# Patient Record
Sex: MALE | Race: BLACK OR AFRICAN AMERICAN | NOT HISPANIC OR LATINO | URBAN - METROPOLITAN AREA
[De-identification: names, ages, dates, MRNs, and addresses within clinical notes are randomized per-mention and may not be internally consistent; named-entity substitution may affect disease eponyms.]

---

## 2017-07-21 ENCOUNTER — ALLSCRIPTS OFFICE VISIT (OUTPATIENT)
Dept: OTHER | Facility: OTHER | Age: 32
End: 2017-07-21

## 2017-10-12 ENCOUNTER — ALLSCRIPTS OFFICE VISIT (OUTPATIENT)
Dept: OTHER | Facility: OTHER | Age: 32
End: 2017-10-12

## 2017-10-13 ENCOUNTER — ALLSCRIPTS OFFICE VISIT (OUTPATIENT)
Dept: OTHER | Facility: OTHER | Age: 32
End: 2017-10-13

## 2018-01-10 NOTE — MISCELLANEOUS
Message  Return to work or school:   Mahesh Johnson is under my professional care  He was seen in my office on 10/13/17  He was seen and examined in our office  He is experiencing symptoms of depression as a result of his chronic left sided gynecomastia, which now necessitates prescription therapy  Shahrzad Vila DO        Signatures   Electronically signed by : Shahrzad Vila DO; Oct 13 2017  9:47PM EST                       (Author)

## 2018-01-13 VITALS
WEIGHT: 185 LBS | BODY MASS INDEX: 28.04 KG/M2 | HEIGHT: 68 IN | TEMPERATURE: 96.9 F | DIASTOLIC BLOOD PRESSURE: 86 MMHG | SYSTOLIC BLOOD PRESSURE: 124 MMHG | HEART RATE: 78 BPM | RESPIRATION RATE: 16 BRPM

## 2018-01-15 NOTE — MISCELLANEOUS
Provider Comments  Provider Comments:   Pt over slept for appt on 10/12/17   rescheduled for 10/13/17      Signatures   Electronically signed by : Sharmin Faye DO; Oct 12 2017 11:34AM EST                       (Author)

## 2018-01-18 NOTE — MISCELLANEOUS
Provider Comments  Provider Comments:   Patient did not show for scheduled appointment today at 8:30am       Signatures   Electronically signed by : BERTHA Ryan ; Sep 15 2017  2:25PM EST

## 2018-11-04 PROBLEM — F41.8 DEPRESSION WITH ANXIETY: Status: ACTIVE | Noted: 2017-10-13

## 2018-11-06 ENCOUNTER — TELEPHONE (OUTPATIENT)
Dept: FAMILY MEDICINE CLINIC | Facility: CLINIC | Age: 33
End: 2018-11-06

## 2019-04-30 ENCOUNTER — OFFICE VISIT (OUTPATIENT)
Dept: FAMILY MEDICINE CLINIC | Facility: CLINIC | Age: 34
End: 2019-04-30
Payer: COMMERCIAL

## 2019-04-30 VITALS
TEMPERATURE: 96.8 F | DIASTOLIC BLOOD PRESSURE: 64 MMHG | WEIGHT: 186 LBS | HEIGHT: 68 IN | HEART RATE: 80 BPM | SYSTOLIC BLOOD PRESSURE: 90 MMHG | BODY MASS INDEX: 28.19 KG/M2 | RESPIRATION RATE: 16 BRPM

## 2019-04-30 DIAGNOSIS — Z13.1 SCREENING FOR DIABETES MELLITUS (DM): ICD-10-CM

## 2019-04-30 DIAGNOSIS — Z13.89 SCREENING FOR CARDIOVASCULAR, RESPIRATORY, AND GENITOURINARY DISEASES: ICD-10-CM

## 2019-04-30 DIAGNOSIS — N62 GYNECOMASTIA: ICD-10-CM

## 2019-04-30 DIAGNOSIS — Z00.00 HEALTHCARE MAINTENANCE: Primary | ICD-10-CM

## 2019-04-30 DIAGNOSIS — Z13.83 SCREENING FOR CARDIOVASCULAR, RESPIRATORY, AND GENITOURINARY DISEASES: ICD-10-CM

## 2019-04-30 DIAGNOSIS — Z13.6 SCREENING FOR CARDIOVASCULAR, RESPIRATORY, AND GENITOURINARY DISEASES: ICD-10-CM

## 2019-04-30 DIAGNOSIS — F41.8 DEPRESSION WITH ANXIETY: ICD-10-CM

## 2019-04-30 PROCEDURE — 99395 PREV VISIT EST AGE 18-39: CPT | Performed by: FAMILY MEDICINE

## 2019-05-17 LAB
ALBUMIN SERPL-MCNC: 4.3 G/DL (ref 3.6–5.1)
ALBUMIN/GLOB SERPL: 1.6 (CALC) (ref 1–2.5)
ALP SERPL-CCNC: 70 U/L (ref 40–115)
ALT SERPL-CCNC: 18 U/L (ref 9–46)
AST SERPL-CCNC: 16 U/L (ref 10–40)
BILIRUB SERPL-MCNC: 0.5 MG/DL (ref 0.2–1.2)
BUN SERPL-MCNC: 9 MG/DL (ref 7–25)
BUN/CREAT SERPL: NORMAL (CALC) (ref 6–22)
CALCIUM SERPL-MCNC: 9.1 MG/DL (ref 8.6–10.3)
CHLORIDE SERPL-SCNC: 104 MMOL/L (ref 98–110)
CHOLEST SERPL-MCNC: 140 MG/DL
CHOLEST/HDLC SERPL: 3 (CALC)
CO2 SERPL-SCNC: 28 MMOL/L (ref 20–32)
CREAT SERPL-MCNC: 1.01 MG/DL (ref 0.6–1.35)
GLOBULIN SER CALC-MCNC: 2.7 G/DL (CALC) (ref 1.9–3.7)
GLUCOSE SERPL-MCNC: 90 MG/DL (ref 65–99)
HDLC SERPL-MCNC: 47 MG/DL
LDLC SERPL CALC-MCNC: 80 MG/DL (CALC)
NONHDLC SERPL-MCNC: 93 MG/DL (CALC)
POTASSIUM SERPL-SCNC: 4.1 MMOL/L (ref 3.5–5.3)
PROT SERPL-MCNC: 7 G/DL (ref 6.1–8.1)
SL AMB EGFR AFRICAN AMERICAN: 114 ML/MIN/1.73M2
SL AMB EGFR NON AFRICAN AMERICAN: 98 ML/MIN/1.73M2
SODIUM SERPL-SCNC: 138 MMOL/L (ref 135–146)
TRIGL SERPL-MCNC: 58 MG/DL

## 2019-09-27 ENCOUNTER — OFFICE VISIT (OUTPATIENT)
Dept: FAMILY MEDICINE CLINIC | Facility: CLINIC | Age: 34
End: 2019-09-27
Payer: COMMERCIAL

## 2019-09-27 VITALS
HEIGHT: 68 IN | BODY MASS INDEX: 29.55 KG/M2 | DIASTOLIC BLOOD PRESSURE: 70 MMHG | OXYGEN SATURATION: 95 % | HEART RATE: 67 BPM | SYSTOLIC BLOOD PRESSURE: 110 MMHG | RESPIRATION RATE: 16 BRPM | TEMPERATURE: 97 F | WEIGHT: 195 LBS

## 2019-09-27 DIAGNOSIS — N61.0 ACUTE MASTITIS OF LEFT BREAST: Primary | ICD-10-CM

## 2019-09-27 DIAGNOSIS — N62 GYNECOMASTIA: ICD-10-CM

## 2019-09-27 DIAGNOSIS — F41.8 DEPRESSION WITH ANXIETY: ICD-10-CM

## 2019-09-27 DIAGNOSIS — Z72.53 HIGH RISK BISEXUAL BEHAVIOR: ICD-10-CM

## 2019-09-27 PROCEDURE — 99214 OFFICE O/P EST MOD 30 MIN: CPT | Performed by: FAMILY MEDICINE

## 2019-09-27 PROCEDURE — 3008F BODY MASS INDEX DOCD: CPT | Performed by: FAMILY MEDICINE

## 2019-09-27 RX ORDER — AMOXICILLIN AND CLAVULANATE POTASSIUM 875; 125 MG/1; MG/1
1 TABLET, FILM COATED ORAL 2 TIMES DAILY
Qty: 20 TABLET | Refills: 0 | Status: SHIPPED | OUTPATIENT
Start: 2019-09-27 | End: 2019-10-07

## 2019-09-27 NOTE — PROGRESS NOTES
Assessment/Plan:    No problem-specific Assessment & Plan notes found for this encounter  Gynecomastia causes him mental anguish, on medication  Warm compresses  US left breast  F/u with surgeon  Has been to endocrinologist in past  Depression stable on meds     Diagnoses and all orders for this visit:    Acute mastitis of left breast  -     amoxicillin-clavulanate (AUGMENTIN) 875-125 mg per tablet; Take 1 tablet by mouth 2 (two) times a day for 10 days  -     US breast left limited (diagnostic); Future    Depression with anxiety  -     sertraline (ZOLOFT) 50 mg tablet; Take 1 tablet (50 mg total) by mouth daily    Gynecomastia    High risk bisexual behavior        Return if symptoms worsen or fail to improve  Subjective:      Patient ID: Jarret Aguilar is a 36 y o  male  Chief Complaint   Patient presents with    Medication Refill     wmcma       HPI  Planning to see plastic surgeon on 10/4/19  Left breast painful/swelling lately  Has happened before  No discharge  No redness or fever  Tender  Has been to endo in past    The following portions of the patient's history were reviewed and updated as appropriate: allergies, current medications, past family history, past medical history, past social history, past surgical history and problem list     Review of Systems   Constitutional: Negative for chills and fever  Current Outpatient Medications   Medication Sig Dispense Refill    Emtricitabine-Tenofovir DF (TRUVADA) 100-150 MG TABS Take 1 tablet by mouth daily      Multiple Vitamin (MULTIVITAMINS PO) Take by mouth      sertraline (ZOLOFT) 50 mg tablet Take 1 tablet (50 mg total) by mouth daily 30 tablet 5    amoxicillin-clavulanate (AUGMENTIN) 875-125 mg per tablet Take 1 tablet by mouth 2 (two) times a day for 10 days 20 tablet 0     No current facility-administered medications for this visit          Objective:    /70   Pulse 67   Temp (!) 97 °F (36 1 °C)   Resp 16   Ht 5' 7 5" (1 715 m)   Wt 88 5 kg (195 lb)   SpO2 95%   BMI 30 09 kg/m²        Physical Exam   Constitutional: No distress  HENT:   Right Ear: External ear normal    Left Ear: External ear normal    Mouth/Throat: No oropharyngeal exudate  Eyes: No scleral icterus  Cardiovascular: Normal rate and regular rhythm  Pulmonary/Chest: Breath sounds normal  No respiratory distress  Abdominal: Soft  He exhibits no distension  Skin: No rash noted  No pallor     Left breast fullness and tenderness, slight warmth, no skin changes, some tender glandular changes retroareolar              Dotty Junes, DO

## 2019-10-03 ENCOUNTER — HOSPITAL ENCOUNTER (OUTPATIENT)
Dept: ULTRASOUND IMAGING | Facility: CLINIC | Age: 34
Discharge: HOME | End: 2019-10-03
Payer: COMMERCIAL

## 2019-10-03 ENCOUNTER — HOSPITAL ENCOUNTER (OUTPATIENT)
Dept: MAMMOGRAPHY | Facility: CLINIC | Age: 34
Discharge: HOME | End: 2019-10-03
Payer: COMMERCIAL

## 2019-10-03 VITALS — BODY MASS INDEX: 29.55 KG/M2 | HEIGHT: 68 IN | WEIGHT: 195 LBS

## 2019-10-03 DIAGNOSIS — N61.0 ACUTE MASTITIS: ICD-10-CM

## 2019-10-03 DIAGNOSIS — N61.0 ACUTE MASTITIS OF LEFT BREAST: ICD-10-CM

## 2019-10-03 PROCEDURE — 76642 ULTRASOUND BREAST LIMITED: CPT

## 2019-10-03 PROCEDURE — 77066 DX MAMMO INCL CAD BI: CPT

## 2019-11-13 ENCOUNTER — SOCIAL WORK (OUTPATIENT)
Dept: BEHAVIORAL/MENTAL HEALTH CLINIC | Facility: CLINIC | Age: 34
End: 2019-11-13
Payer: COMMERCIAL

## 2019-11-13 DIAGNOSIS — F33.1 MAJOR DEPRESSIVE DISORDER, RECURRENT EPISODE, MODERATE (HCC): Primary | ICD-10-CM

## 2019-11-13 DIAGNOSIS — F41.1 GENERALIZED ANXIETY DISORDER: ICD-10-CM

## 2019-11-13 PROCEDURE — 90834 PSYTX W PT 45 MINUTES: CPT | Performed by: SOCIAL WORKER

## 2019-11-13 NOTE — PSYCH
Assessment/Plan:     F33 1  Major depressive disorder, recurrent episode, moderate  F41  Generalized anxiety disorder  Subjective:    Patient ID: Ryanne Mccullough is a 36 y o  male who presented for an initial outpatient individual counseling session following his most recent office visits with his primary care physician on April 30, 2019 and September 27, 2019  At the April 30, 2019 office visit, Corrie Madden disclosed he goes to a STD clinic in [facility] and is prescribed Truvada  He presents with chronic gynecomastia and was recommended to see a plastic surgeon  At that time, Corrie Madden declined a referral to [address] [place] [address] for outpatient counseling services and as a result, was started on Zoloft 50 MG  For his September 27, 2019 office visit, Corrie Madden presented with acute mastitis of left breast   His gynecomastia causes him mental anguish  He has been treated by an endocrinologist in the past   Corrie Madden engages in high risk bisexual behavior  He is depressed and anxious and was referred to [address] [place] [address] for outpatient counseling services  He has been compliant with Zoloft 50 MG  The undersigned therapist provided Corrie Madden with an orientation to [address] [place] [address] services by summarizing the counseling experience, counseling process, and philosophy of treatment  Discussed hospital policies and paid special attention to reviewing the limitations of confidentiality and emergency policies  Began the process of establishing rapport and gaining a thorough understanding of the presenting problem by completing a comprehensive psychosocial assessment  Corrie Madden states he feels "up and down"  When he started Zoloft he initially felt better but now he is unsure whether he is depressed or not  Corrie Madden claims that people keep telling him he's depressed  There is no prior history of participating in outpatient mental health services      He admits to having suicidal ideation last year  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  He dropped out of college  Opal Springer lives at home with his family and does not report any stressors there  The undersigned therapist recommended that Opal Springer participate in outpatient counseling services once per week  Due to the undersigned therapist's booked schedule, he was provided with alternate options for mental health services  HPI    Review of Systems      Objective:  Opal Springer presented for today's session as his stated age  He was oriented x3 and maintained adequate eye contact  Opal Springer was dressed neatly, casually and was well-groomed with adequate hygiene  He demonstrated the ability to maintain adequate levels of focus and concentration  His short- and long-term memory appeared intact  There was no evidence of a thought disorder or psychosis  He denied suicidal ideation, gesture or plan  Opal Springer presented with a cooperative attitude and he was easily engaged in the therapeutic process  He demonstrated difficulty identifying and expressing his feelings in an age-appropriate manner  His mood was depressed and anxious  John's insight and judgement was assessed to be poor       Physical Exam

## 2019-11-20 ENCOUNTER — SOCIAL WORK (OUTPATIENT)
Dept: BEHAVIORAL/MENTAL HEALTH CLINIC | Facility: CLINIC | Age: 34
End: 2019-11-20
Payer: COMMERCIAL

## 2019-11-20 DIAGNOSIS — F33.1 MAJOR DEPRESSIVE DISORDER, RECURRENT EPISODE, MODERATE (HCC): Primary | ICD-10-CM

## 2019-11-20 PROCEDURE — 90834 PSYTX W PT 45 MINUTES: CPT | Performed by: SOCIAL WORKER

## 2019-11-20 NOTE — PSYCH
Assessment/Plan:     F33 1  Major depressive episode, recurrent episode, moderate  Subjective:    Patient ID: Walter Gann is a 36 y o  male who presented for a follow-up outpatient individual counseling session  Ijeoma Newman is continuing to struggle coping with stressors secondary to gynecomastia  He reports he started working out at [employer] but is self-conscious about his appearance  Ijeoma Newman struggles to identify his feelings and emotions in an age-appropriate manner  With prompting, he appeared to deny depression and anxiety  Ijeoma Newman is scheduled to meet with a plastic surgeon on January 4, 2020 for a consultation regarding surgical options for gynecomastia  He is attempting to get insurance approval for the surgery as he was previously denied when he had coverage through [employer]  Since that time, he has been unsuccessful in his attempts to save $5,000 to pay for the surgery  The undersigned therapist provided supportive counseling regarding John's attempts to seek insurance authorization for the surgery  The undersigned therapist will assist his primary care physician with providing the appropriate documentation to support the medical necessity of the surgery if his current insurance company denies coverage for the surgery  HPI    Review of Systems      Objective:  Ijeoma Newman presented for today's session with a cooperative attitude and was easily engaged in the therapeutic process  His mood was depressed and anxious with an affect that was congruent to topic  There was no evidence of a thought disorder or psychosis  He denied suicidal ideation, gesture or plan  Ijeoma Newman appears to be less depressed and anxious since prescribed antidepressant medication       Physical Exam

## 2020-01-03 ENCOUNTER — OFFICE VISIT (OUTPATIENT)
Dept: PLASTIC SURGERY | Facility: CLINIC | Age: 35
End: 2020-01-03
Payer: COMMERCIAL

## 2020-01-03 VITALS — BODY MASS INDEX: 27.28 KG/M2 | WEIGHT: 180 LBS | HEIGHT: 68 IN

## 2020-01-03 DIAGNOSIS — N61.0 ACUTE MASTITIS OF LEFT BREAST: ICD-10-CM

## 2020-01-03 DIAGNOSIS — N62 GYNECOMASTIA: Primary | ICD-10-CM

## 2020-01-03 PROCEDURE — 99204 OFFICE O/P NEW MOD 45 MIN: CPT | Performed by: PLASTIC SURGERY

## 2020-01-03 RX ORDER — EMTRICITABINE AND TENOFOVIR DISOPROXIL FUMARATE 200; 300 MG/1; MG/1
TABLET, FILM COATED ORAL
COMMUNITY
Start: 2019-10-28 | End: 2020-04-22

## 2020-01-03 NOTE — PROGRESS NOTES
Assessment/Plan   Diagnoses and all orders for this visit:    Gynecomastia    Acute mastitis of left breast    Other orders  -     TRUVADA 200-300 MG per tablet    Mr Miriam Dye is a 36 y o  F with benign Grade IIII gynecomastia related primarily to puberty and possible hormonal related, exacerbated by previous medications in the past   We discussed the nature of surgery available for his problem    I believe she would benefit from left nipple sparing mastectomy via an infraareola incision would be the most likely method of reduction  The procedure was discussed at length with the patient during her visit today  The operative details and expected post-operative course were outlined  I also explained that potential complications included, but were not limited to: change or loss in sensation of the nipple and surrounding nipple/areola complex that may be transient or permanent, scars that will be visible on cut surfaces of the breast, wound separation, infection, breast asymmetry, increased or decreased pigmentation of the skin and/or nipple-areola complex, pain, blood loss, hematoma, seroma, contour deformity, and fat necrosis  The patient expressed a reasonable understanding of the procedure and possible complications  Photos were taken at today's visit  We will plan to see Mr Bauman back for a pre-operative visit once her surgery is scheduled to review the details of her operation, answer further questions, and place operative markings  I spent 40 minutes in face to face time with this patient, and over half of this time was spent in discussing surgical options and educating the patient  Adarsh Walsh MD   [place] [id number]   [address] [facility], [address]   Office: [phone]        Subjective   Patient ID: Walter Gann is a 36 y o  male      Vitals:     Walter Gann is a 36 y o male who comes in today to discuss male gynecomastia  In brief he has had enlarged left breast since [age] years old    He has been evaluated many years ago by many medical care and endocrinologist at outside hospital and was told that no specific cause was found and potentially could improve with time  Patient manifest that it has gotten worse over the years causing significant discomfort and intermittent pain  He was offered tamoxifen medication in the past but would like to discuss surgical options  He is under the care of his PCP and it has been recommended that he consult with us regarding treatment options for gynecomastia  Important aspects of his personal history that bear upon possible causes for his gynecomastia include:    - puberty: positive  - obesity: negative   - Klinefelter's Syndrome: negative  - Gilbert's Syndrome: negative  - other genetic disorders:negative  - endocrine system abnormalities: negative  - chronic steroid use: negative   - use of other medications with feminizing side effects: negative, but takesTruvada since 2016 due to high risk HIV, Bisexual history  - chronic liver disease: negative  - castration: negative  - advanced aging: negative     It appears that his gynecomastia is related to benign changes in hormone status associated with puberty    He is interested in what reconstructive options might be available  Her most recent mammogram was on 10/3/2019 and was read as   1  There is marked diffuse left breast gynecomastia, with no right gynecomastia identified  2  The areas of symptoms in the left breast correlate to islands of fibroglandular tissue on ultrasound  3  Management of any clinical symptoms or findings must be based on clinical grounds         ASSESSMENT/BI-RADS CATEGORY:  Left: 2 - Benign  Right: 1 - Negative  Overall: 2 - Benign     RECOMMENDATION:       - Clinical management for both breasts          The following portions of the patient's history were reviewed and updated as appropriate: allergies, current medications, past family history, past medical history, past social history, past surgical history and problem list     Review of Systems   Constitutional: Negative  HENT: Negative  Eyes: Negative  Respiratory: Negative  Cardiovascular: Negative  Gastrointestinal: Negative  Endocrine: Negative  Genitourinary: Negative  Musculoskeletal: Negative  Skin: Negative  Allergic/Immunologic: Negative  Neurological: Negative  Hematological: Negative  Psychiatric/Behavioral: Negative  Objective   Physical Exam   Constitutional  He appears well-developed and well-nourished  Cardiovascular: Normal rate  Pulmonary/Chest  Effort normal    There is left breast tissue ptosis grade 1, with mild excess adipose tissue and moderate excess of glandular retroareolar glandular tissue, mild tender, no nipple discharge     Skin  Skin is warm  Psychiatric  He has a normal mood and affect  His behavior is normal  Thought content normal      Abdomen and Hips  Soft

## 2020-01-15 ENCOUNTER — SOCIAL WORK (OUTPATIENT)
Dept: BEHAVIORAL/MENTAL HEALTH CLINIC | Facility: CLINIC | Age: 35
End: 2020-01-15
Payer: COMMERCIAL

## 2020-01-15 DIAGNOSIS — F43.20 ADJUSTMENT DISORDER, UNSPECIFIED TYPE: Primary | ICD-10-CM

## 2020-01-15 PROCEDURE — 90832 PSYTX W PT 30 MINUTES: CPT | Performed by: SOCIAL WORKER

## 2020-01-15 NOTE — PSYCH
Assessment/Plan:     F43 20  Adjustment disorder, NOS  Subjective:    Patient ID: Meg Marinelli is a 36 y o  male who presented for a follow-up outpatient individual counseling session following a service gap of approximately 7 weeks  Prior to today's session, Kayla Bailey had an office visit with his plastic surgeon on January 3, 2020 for evaluation of gynecomastia (acute mastitis of left breast)  Since his office visit with his plastic surgeon, Kayla Bailey is feeling much better emotionally and has an optimistic attitude about his future  He has been approved for surgery but needs authorization from his health insurance company which is pending  Kayla Bailey has been struggling with some family problems, work and financial stressors  He is in the process of seeking a raise from his employer and has been frustrated with the delay  Kayla Bailey has been going to the gym on a regular basis and this has influenced his mood positively  Kayla Bailey is concerned about his hair changing colors on his eye lashes, hands and arms  He is planning on scheduling an office visit with his primary care physician for an evaluation and treatment  Kayla Bailey is concerned that his hair changing colors is due to chemicals that he is using at work  He and his co-workers have recently met with management to discuss their concerns  Kayla Bailey did not appear to be invested in the therapeutic process  He feels he's made progress since the onset of counseling services with the undersigned therapist and mistakenly thought that he was mandated to participate in therapy pending his surgery  HPI    Review of Systems      Objective:  Kayla Bailey presented for today's session with a cooperative attitude and was easily engaged in the therapeutic process  His mood was aloof with an affect that was congruent to topic  There was no evidence of a thought disorder or psychosis  He denied suicidal ideation, gesture or plan     Kayla Bailey denied depressed mood, anxiety or difficulty managing his mood       Physical Exam

## 2020-02-05 ENCOUNTER — TELEPHONE (OUTPATIENT)
Dept: FAMILY MEDICINE CLINIC | Facility: CLINIC | Age: 35
End: 2020-02-05

## 2020-02-05 NOTE — TELEPHONE ENCOUNTER
The bill is from [address] [facility], not from us  Please call the patient and notify him  If he has questions he needs to contact them

## 2020-02-05 NOTE — TELEPHONE ENCOUNTER
Alfredo Rhodes got a bill from apt 1/29  He was never seen and wants to know why he is getting a bill?   Please call patient back     Thank you

## 2020-02-07 ENCOUNTER — TELEPHONE (OUTPATIENT)
Dept: FAMILY MEDICINE CLINIC | Facility: CLINIC | Age: 35
End: 2020-02-07

## 2020-02-07 ENCOUNTER — OFFICE VISIT (OUTPATIENT)
Dept: FAMILY MEDICINE CLINIC | Facility: CLINIC | Age: 35
End: 2020-02-07
Payer: COMMERCIAL

## 2020-02-07 VITALS
HEART RATE: 88 BPM | TEMPERATURE: 98.7 F | BODY MASS INDEX: 31.22 KG/M2 | OXYGEN SATURATION: 98 % | SYSTOLIC BLOOD PRESSURE: 120 MMHG | DIASTOLIC BLOOD PRESSURE: 90 MMHG | HEIGHT: 68 IN | RESPIRATION RATE: 16 BRPM | WEIGHT: 206 LBS

## 2020-02-07 DIAGNOSIS — N62 GYNECOMASTIA: Primary | ICD-10-CM

## 2020-02-07 DIAGNOSIS — F41.8 DEPRESSION WITH ANXIETY: ICD-10-CM

## 2020-02-07 DIAGNOSIS — Z72.53 HIGH RISK BISEXUAL BEHAVIOR: ICD-10-CM

## 2020-02-07 PROBLEM — N61.0 ACUTE MASTITIS OF LEFT BREAST: Status: RESOLVED | Noted: 2019-09-27 | Resolved: 2020-02-07

## 2020-02-07 PROCEDURE — 99214 OFFICE O/P EST MOD 30 MIN: CPT | Performed by: FAMILY MEDICINE

## 2020-02-07 PROCEDURE — 1036F TOBACCO NON-USER: CPT | Performed by: FAMILY MEDICINE

## 2020-02-07 PROCEDURE — 3008F BODY MASS INDEX DOCD: CPT | Performed by: FAMILY MEDICINE

## 2020-02-07 RX ORDER — SERTRALINE HYDROCHLORIDE 100 MG/1
100 TABLET, FILM COATED ORAL DAILY
Qty: 90 TABLET | Refills: 1 | Status: SHIPPED | OUTPATIENT
Start: 2020-02-07 | End: 2020-03-20 | Stop reason: SDUPTHER

## 2020-02-07 NOTE — LETTER
February 7, 2020     Patient: Berenice Peck   YOB: 1985   Date of Visit: 2/7/2020       To Whom it May Concern:    Berenice Peck is under my professional care  He was seen in my office on 2/7/2020  He has left sided gynecomastia that has resulted in clinical anxiety and is being prescribed medications as well as seeing a counselor  The condition continues to cause him mental distress  If you have any questions or concerns, please don't hesitate to call           Sincerely,          Guero Angel, DO        CC: No Recipients

## 2020-02-07 NOTE — TELEPHONE ENCOUNTER
Patient wanted to let you know that the surgery was denied  He would like to know if you could provide a letter as he states you proposed to help him try and get the surgery approved? Scheduled and appointment to see you on 3/11/2020, but would like to hear back from you about the letter before his appointment  Please advise    Ermias Rangel MA

## 2020-02-07 NOTE — PROGRESS NOTES
Assessment/Plan:    No problem-specific Assessment & Plan notes found for this encounter  Depression and anxiety  Willing to try higher dose of zoloft 50mg to 100mg  F/u if no better  q6m routine f/u  Long discussion about his desire to have surgery for left sided gynecomastia, he has a lot of anxiety about the condition but has been informed it was benign and the procedure is cosmetic  He still would like to see if he can argue with his insurance to cover it  He is aware that prior mammo and US breast did not show anything suspicious    We agreed upon a letter I wrote for him about how the condition affects him in order to see if his insurance company would reconsider    His risks of STD were discussed and safe sex practices advised    F/u mood in 1m if no better  Denies suicidal/homicidal/destructive ideations  Diagnoses and all orders for this visit:    Gynecomastia    Depression with anxiety  -     sertraline (ZOLOFT) 100 mg tablet; Take 1 tablet (100 mg total) by mouth daily    High risk bisexual behavior              Return in about 6 months (around 8/7/2020) for Recheck  Subjective:      Patient ID: Juliette Gillis is a 36 y o  male  Chief Complaint   Patient presents with    Anxiety     wmcma       HPI  Aware of prior tests  Not covered by pt  Very anxious about it still  Denies suicidal/homicidal/destructive ideations  Has been to endo in past, too late for hormone manipulation per pt  He is upset that he would have to take any medication, including the zoloft, to have to deal with the gynecomastia  Tolerates zoloft at present    The following portions of the patient's history were reviewed and updated as appropriate: allergies, current medications, past family history, past medical history, past social history, past surgical history and problem list     Review of Systems   Respiratory: Negative for shortness of breath  Cardiovascular: Negative for chest pain     Psychiatric/Behavioral: Positive for dysphoric mood  The patient is nervous/anxious  Current Outpatient Medications   Medication Sig Dispense Refill    Multiple Vitamin (MULTIVITAMINS PO) Take by mouth      sertraline (ZOLOFT) 100 mg tablet Take 1 tablet (100 mg total) by mouth daily 90 tablet 1    TRUVADA 200-300 MG per tablet        No current facility-administered medications for this visit  Objective:    /90   Pulse 88   Temp 98 7 °F (37 1 °C)   Resp 16   Ht 5' 8" (1 727 m)   Wt 93 4 kg (206 lb)   SpO2 98%   BMI 31 32 kg/m²        Physical Exam   Constitutional: He appears well-developed  HENT:   Head: Normocephalic  Right Ear: External ear normal    Left Ear: External ear normal    Eyes: Conjunctivae are normal  No scleral icterus  Neck: Neck supple  No thyromegaly present  Cardiovascular: Normal rate, normal heart sounds and intact distal pulses  No murmur heard  Pulmonary/Chest: Effort normal  No respiratory distress  He has no wheezes  Abdominal: Soft  Bowel sounds are normal  He exhibits no distension  Musculoskeletal: He exhibits no edema or deformity  Left gynecomastia   Neurological: He is alert  He exhibits normal muscle tone  Skin: Skin is warm and dry  He is not diaphoretic  No pallor  Psychiatric: His behavior is normal  Thought content normal    Nursing note and vitals reviewed  BMI Counseling: Body mass index is 31 32 kg/m²  The BMI is above normal  Nutrition recommendations include moderation in carbohydrate intake  Exercise recommendations include exercising 3-5 times per week  No pharmacotherapy was ordered                Sacha Miller DO

## 2020-02-10 ENCOUNTER — TELEPHONE (OUTPATIENT)
Dept: BEHAVIORAL/MENTAL HEALTH CLINIC | Facility: CLINIC | Age: 35
End: 2020-02-10

## 2020-02-10 NOTE — TELEPHONE ENCOUNTER
Received a request to return John's phone call regarding helping him with his insurance appeal for surgery  Requested that he return the undersigned therapist's phone call

## 2020-02-19 ENCOUNTER — TELEPHONE (OUTPATIENT)
Dept: FAMILY MEDICINE CLINIC | Facility: CLINIC | Age: 35
End: 2020-02-19

## 2020-02-25 NOTE — TELEPHONE ENCOUNTER
I spoke to the patient at length about his insurance appeal   I would like him to schedule a session as soon as possible

## 2020-02-26 ENCOUNTER — SOCIAL WORK (OUTPATIENT)
Dept: BEHAVIORAL/MENTAL HEALTH CLINIC | Facility: CLINIC | Age: 35
End: 2020-02-26
Payer: COMMERCIAL

## 2020-02-26 DIAGNOSIS — F43.23 ADJUSTMENT DISORDER WITH MIXED ANXIETY AND DEPRESSED MOOD: Primary | ICD-10-CM

## 2020-02-26 PROCEDURE — 1036F TOBACCO NON-USER: CPT | Performed by: SOCIAL WORKER

## 2020-02-26 PROCEDURE — 90834 PSYTX W PT 45 MINUTES: CPT | Performed by: SOCIAL WORKER

## 2020-02-26 NOTE — PSYCH
Assessment/Plan:     F43 23  Adjustment disorder with mixed anxiety and depressed mood  Subjective:    Patient ID: Sherrie Harris is a 36 y o  male who presented for a follow-up outpatient individual counseling session after Pamella Varela walked out the waiting room and was a no show for his previously-scheduled outpatient session  Prior to today's session, after receiving a request from Pamella Varela via phone to assist him with appealing his health insurance's denial of surgical procedure for gynecomastia, Pamella Varela was directed to schedule an office visit with the undersigned therapist as soon as possible  For today's session, the undersigned therapist reviewed John's primary care physician's letter to his insurance company advocating for approval of male breast reduction surgery  The undersigned therapist assisted Pamella Varela process his feelings about insurance denial    He states after he received the insurance denial, he felt angry, frustrated, depressed and anxious  Pamella Varela found it difficult to work due to his escalating anger  The undersigned therapist authored, reviewed and discussed proposed letter to [employer]  At the conclusion of today's session with the undersigned therapist, Pamella Varela was given a copy of the letter to submit to his insurance company  HPI    Review of Systems      Objective:  Pamella Varela presented for today's session with a cooperative attitude and was easily engaged in the therapeutic process  His mood was depressed and anxious with an affect that was congruent to topic  There was no evidence of a thought disorder or psychosis  He denied suicidal ideation, gesture or plan  Pamella Varela has felt angry, frustrated, depressed and anxious since he received insurance dental for male breast reduction surgery       Physical Exam

## 2020-03-20 DIAGNOSIS — F41.8 DEPRESSION WITH ANXIETY: ICD-10-CM

## 2020-03-20 RX ORDER — SERTRALINE HYDROCHLORIDE 100 MG/1
100 TABLET, FILM COATED ORAL DAILY
Qty: 90 TABLET | Refills: 1 | Status: SHIPPED | OUTPATIENT
Start: 2020-03-20 | End: 2020-04-22 | Stop reason: SDUPTHER

## 2020-03-20 NOTE — TELEPHONE ENCOUNTER
Please send prescription Ana [address]   [phone]  Please send to [place]- He can't get back to [place] for awhile    Refill Sertaline

## 2020-04-22 ENCOUNTER — TELEMEDICINE (OUTPATIENT)
Dept: FAMILY MEDICINE CLINIC | Facility: CLINIC | Age: 35
End: 2020-04-22
Payer: COMMERCIAL

## 2020-04-22 DIAGNOSIS — G43.009 MIGRAINE WITHOUT AURA AND WITHOUT STATUS MIGRAINOSUS, NOT INTRACTABLE: Primary | ICD-10-CM

## 2020-04-22 DIAGNOSIS — F41.8 DEPRESSION WITH ANXIETY: ICD-10-CM

## 2020-04-22 DIAGNOSIS — N62 GYNECOMASTIA: ICD-10-CM

## 2020-04-22 PROCEDURE — 99214 OFFICE O/P EST MOD 30 MIN: CPT | Performed by: FAMILY MEDICINE

## 2020-04-22 RX ORDER — EMTRICITABINE AND TENOFOVIR ALAFENAMIDE 200; 25 MG/1; MG/1
TABLET ORAL
COMMUNITY
Start: 2020-03-23

## 2020-04-22 RX ORDER — BUTALBITAL, ACETAMINOPHEN AND CAFFEINE 50; 325; 40 MG/1; MG/1; MG/1
1 TABLET ORAL EVERY 6 HOURS PRN
Qty: 40 TABLET | Refills: 1 | Status: SHIPPED | OUTPATIENT
Start: 2020-04-22 | End: 2021-11-22

## 2020-04-22 RX ORDER — SERTRALINE HYDROCHLORIDE 100 MG/1
100 TABLET, FILM COATED ORAL DAILY
Qty: 90 TABLET | Refills: 1 | Status: SHIPPED | OUTPATIENT
Start: 2020-04-22 | End: 2020-09-22

## 2020-08-19 ENCOUNTER — TELEPHONE (OUTPATIENT)
Dept: FAMILY MEDICINE CLINIC | Facility: CLINIC | Age: 35
End: 2020-08-19

## 2020-09-22 ENCOUNTER — OFFICE VISIT (OUTPATIENT)
Dept: FAMILY MEDICINE CLINIC | Facility: CLINIC | Age: 35
End: 2020-09-22
Payer: COMMERCIAL

## 2020-09-22 VITALS
DIASTOLIC BLOOD PRESSURE: 60 MMHG | RESPIRATION RATE: 16 BRPM | HEART RATE: 78 BPM | SYSTOLIC BLOOD PRESSURE: 94 MMHG | HEIGHT: 69 IN | OXYGEN SATURATION: 96 % | WEIGHT: 210 LBS | TEMPERATURE: 96.8 F | BODY MASS INDEX: 31.1 KG/M2

## 2020-09-22 DIAGNOSIS — Z13.83 SCREENING FOR CARDIOVASCULAR, RESPIRATORY, AND GENITOURINARY DISEASES: ICD-10-CM

## 2020-09-22 DIAGNOSIS — N62 GYNECOMASTIA: ICD-10-CM

## 2020-09-22 DIAGNOSIS — Z72.53 HIGH RISK BISEXUAL BEHAVIOR: ICD-10-CM

## 2020-09-22 DIAGNOSIS — E66.9 OBESITY (BMI 30-39.9): ICD-10-CM

## 2020-09-22 DIAGNOSIS — Z13.89 SCREENING FOR CARDIOVASCULAR, RESPIRATORY, AND GENITOURINARY DISEASES: ICD-10-CM

## 2020-09-22 DIAGNOSIS — Z13.1 SCREENING FOR DIABETES MELLITUS (DM): ICD-10-CM

## 2020-09-22 DIAGNOSIS — Z23 IMMUNIZATION DUE: ICD-10-CM

## 2020-09-22 DIAGNOSIS — Z00.00 HEALTHCARE MAINTENANCE: Primary | ICD-10-CM

## 2020-09-22 DIAGNOSIS — Z13.6 SCREENING FOR CARDIOVASCULAR, RESPIRATORY, AND GENITOURINARY DISEASES: ICD-10-CM

## 2020-09-22 PROBLEM — F41.8 DEPRESSION WITH ANXIETY: Status: RESOLVED | Noted: 2017-10-13 | Resolved: 2020-09-22

## 2020-09-22 PROCEDURE — 99395 PREV VISIT EST AGE 18-39: CPT | Performed by: FAMILY MEDICINE

## 2020-09-22 PROCEDURE — 3725F SCREEN DEPRESSION PERFORMED: CPT | Performed by: FAMILY MEDICINE

## 2020-09-22 PROCEDURE — 90715 TDAP VACCINE 7 YRS/> IM: CPT

## 2020-09-22 PROCEDURE — 90471 IMMUNIZATION ADMIN: CPT

## 2020-09-22 PROCEDURE — 1036F TOBACCO NON-USER: CPT | Performed by: FAMILY MEDICINE

## 2020-09-22 NOTE — PROGRESS NOTES
Assessment/Plan:    No problem-specific Assessment & Plan notes found for this encounter  cpe   Labs done  Advised again of STD precautions  Gets PREP and screening elsewhere  Headaches stable  Mood stable w/o meds    Bisexual    Group sex per pt     Diagnoses and all orders for this visit:    Healthcare maintenance    Gynecomastia    Obesity (BMI 30-39  9)    Screening for cardiovascular, respiratory, and genitourinary diseases  -     Lipid Panel with Direct LDL reflex; Future    Screening for diabetes mellitus (DM)  -     Comprehensive metabolic panel; Future    High risk bisexual behavior    Immunization due  -     TDAP VACCINE GREATER THAN OR EQUAL TO 6YO IM        Return if symptoms worsen or fail to improve  Subjective:      Patient ID: Kailey Dnih is a 30 y o  male  Chief Complaint   Patient presents with    Physical Exam     [id number]        HPI  Here for cpe  Works for [employer], [employer]    Exercises about 5x/d at gym  Diet w/o restrictions, has veggies and fruit and fish/seafood    Plans to lose wt    Planning breast surgery    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[facility], plastic surgeon    Not on zoloft for months on own  Coping for now  Did not wean and did not have problem  Had tetanus 2010 abouts    Headaches resolved    The following portions of the patient's history were reviewed and updated as appropriate: allergies, current medications, past family history, past medical history, past social history, past surgical history and problem list     Review of Systems   Cardiovascular: Negative for chest pain  Neurological: Negative for dizziness           Current Outpatient Medications   Medication Sig Dispense Refill    butalbital-acetaminophen-caffeine (FIORICET,ESGIC) -40 mg per tablet Take 1 tablet by mouth every 6 (six) hours as needed for migraine 40 tablet 1    DESCOVY 200-25 MG tablet       Multiple Vitamin (MULTIVITAMINS PO) Take by mouth       No current facility-administered medications for this visit  Objective:    BP 94/60   Pulse 78   Temp (!) 96 8 °F (36 °C)   Resp 16   Ht 5' 8 5" (1 74 m)   Wt 95 3 kg (210 lb)   SpO2 96%   BMI 31 47 kg/m²        Physical Exam  Vitals signs and nursing note reviewed  Constitutional:       Appearance: He is well-developed  He is not ill-appearing or diaphoretic  HENT:      Head: Normocephalic  Right Ear: Tympanic membrane, ear canal and external ear normal       Left Ear: Tympanic membrane, ear canal and external ear normal       Nose: No rhinorrhea  Mouth/Throat:      Pharynx: No posterior oropharyngeal erythema  Eyes:      General: No scleral icterus  Conjunctiva/sclera: Conjunctivae normal    Neck:      Musculoskeletal: Neck supple  No neck rigidity  Cardiovascular:      Rate and Rhythm: Normal rate and regular rhythm  Heart sounds: No murmur  Pulmonary:      Effort: Pulmonary effort is normal  No respiratory distress  Breath sounds: No wheezing  Abdominal:      General: There is no distension  Palpations: Abdomen is soft  Tenderness: There is no abdominal tenderness  Musculoskeletal:         General: No deformity  Comments: Left gynecomastia   Lymphadenopathy:      Cervical: No cervical adenopathy  Skin:     General: Skin is warm and dry  Coloration: Skin is not pale  Neurological:      Mental Status: He is alert  Motor: No weakness  Gait: Gait normal    Psychiatric:         Mood and Affect: Mood normal          Behavior: Behavior normal          Thought Content: Thought content normal          BMI Counseling: Body mass index is 31 47 kg/m²  The BMI is above normal  Nutrition recommendations include decreasing portion sizes and moderation in carbohydrate intake  Exercise recommendations include exercising 3-5 times per week  No pharmacotherapy was ordered                Norm Iza, DO

## 2020-10-09 LAB
ALBUMIN SERPL-MCNC: 4.4 G/DL (ref 3.6–5.1)
ALBUMIN/GLOB SERPL: 1.6 (CALC) (ref 1–2.5)
ALP SERPL-CCNC: 63 U/L (ref 36–130)
ALT SERPL-CCNC: 20 U/L (ref 9–46)
AST SERPL-CCNC: 26 U/L (ref 10–40)
BILIRUB SERPL-MCNC: 0.7 MG/DL (ref 0.2–1.2)
BUN SERPL-MCNC: 13 MG/DL (ref 7–25)
BUN/CREAT SERPL: NORMAL (CALC) (ref 6–22)
CALCIUM SERPL-MCNC: 9 MG/DL (ref 8.6–10.3)
CHLORIDE SERPL-SCNC: 104 MMOL/L (ref 98–110)
CHOLEST SERPL-MCNC: 157 MG/DL
CHOLEST/HDLC SERPL: 3 (CALC)
CO2 SERPL-SCNC: 29 MMOL/L (ref 20–32)
CREAT SERPL-MCNC: 1.03 MG/DL (ref 0.6–1.35)
GLOBULIN SER CALC-MCNC: 2.8 G/DL (CALC) (ref 1.9–3.7)
GLUCOSE SERPL-MCNC: 85 MG/DL (ref 65–99)
HDLC SERPL-MCNC: 52 MG/DL
LDLC SERPL CALC-MCNC: 90 MG/DL (CALC)
NONHDLC SERPL-MCNC: 105 MG/DL (CALC)
POTASSIUM SERPL-SCNC: 4.1 MMOL/L (ref 3.5–5.3)
PROT SERPL-MCNC: 7.2 G/DL (ref 6.1–8.1)
SL AMB EGFR AFRICAN AMERICAN: 109 ML/MIN/1.73M2
SL AMB EGFR NON AFRICAN AMERICAN: 94 ML/MIN/1.73M2
SODIUM SERPL-SCNC: 139 MMOL/L (ref 135–146)
TRIGL SERPL-MCNC: 68 MG/DL

## 2021-04-01 ENCOUNTER — APPOINTMENT (OUTPATIENT)
Dept: LAB | Age: 36
End: 2021-04-01
Payer: COMMERCIAL

## 2021-04-01 ENCOUNTER — TRANSCRIBE ORDERS (OUTPATIENT)
Dept: ADMINISTRATIVE | Age: 36
End: 2021-04-01

## 2021-04-01 DIAGNOSIS — R76.11 NONSPECIFIC REACTION TO TUBERCULIN TEST: ICD-10-CM

## 2021-04-01 DIAGNOSIS — Z11.59 SCREENING EXAMINATION FOR POLIOMYELITIS: ICD-10-CM

## 2021-04-01 DIAGNOSIS — Z13.6 SCREENING FOR CARDIOVASCULAR, RESPIRATORY, AND GENITOURINARY DISEASES: ICD-10-CM

## 2021-04-01 DIAGNOSIS — Z11.9 SCREENING EXAMINATION FOR UNSPECIFIED INFECTIOUS DISEASE: Primary | ICD-10-CM

## 2021-04-01 DIAGNOSIS — Z11.9 SCREENING EXAMINATION FOR UNSPECIFIED INFECTIOUS DISEASE: ICD-10-CM

## 2021-04-01 DIAGNOSIS — Z13.83 SCREENING FOR CARDIOVASCULAR, RESPIRATORY, AND GENITOURINARY DISEASES: ICD-10-CM

## 2021-04-01 DIAGNOSIS — Z13.89 SCREENING FOR CARDIOVASCULAR, RESPIRATORY, AND GENITOURINARY DISEASES: ICD-10-CM

## 2021-04-01 DIAGNOSIS — Z13.1 SCREENING FOR DIABETES MELLITUS (DM): ICD-10-CM

## 2021-04-01 LAB
ALBUMIN SERPL BCP-MCNC: 3.8 G/DL (ref 3.5–5)
ALP SERPL-CCNC: 65 U/L (ref 46–116)
ALT SERPL W P-5'-P-CCNC: 36 U/L (ref 12–78)
ANION GAP SERPL CALCULATED.3IONS-SCNC: 2 MMOL/L (ref 4–13)
AST SERPL W P-5'-P-CCNC: 29 U/L (ref 5–45)
BILIRUB SERPL-MCNC: 0.7 MG/DL (ref 0.2–1)
BUN SERPL-MCNC: 11 MG/DL (ref 5–25)
CALCIUM SERPL-MCNC: 8.5 MG/DL (ref 8.3–10.1)
CHLORIDE SERPL-SCNC: 103 MMOL/L (ref 100–108)
CHOLEST SERPL-MCNC: 137 MG/DL (ref 50–200)
CO2 SERPL-SCNC: 30 MMOL/L (ref 21–32)
CREAT SERPL-MCNC: 1.47 MG/DL (ref 0.6–1.3)
GFR SERPL CREATININE-BSD FRML MDRD: 71 ML/MIN/1.73SQ M
GLUCOSE P FAST SERPL-MCNC: 82 MG/DL (ref 65–99)
HDLC SERPL-MCNC: 46 MG/DL
LDLC SERPL CALC-MCNC: 72 MG/DL (ref 0–100)
POTASSIUM SERPL-SCNC: 4.2 MMOL/L (ref 3.5–5.3)
PROT SERPL-MCNC: 7.7 G/DL (ref 6.4–8.2)
RUBV IGG SERPL IA-ACNC: >175 IU/ML
SODIUM SERPL-SCNC: 135 MMOL/L (ref 136–145)
TRIGL SERPL-MCNC: 97 MG/DL

## 2021-04-01 PROCEDURE — 86735 MUMPS ANTIBODY: CPT

## 2021-04-01 PROCEDURE — 36415 COLL VENOUS BLD VENIPUNCTURE: CPT

## 2021-04-01 PROCEDURE — 86780 TREPONEMA PALLIDUM: CPT

## 2021-04-01 PROCEDURE — 86762 RUBELLA ANTIBODY: CPT

## 2021-04-01 PROCEDURE — 80061 LIPID PANEL: CPT

## 2021-04-01 PROCEDURE — 86592 SYPHILIS TEST NON-TREP QUAL: CPT

## 2021-04-01 PROCEDURE — 86593 SYPHILIS TEST NON-TREP QUANT: CPT

## 2021-04-01 PROCEDURE — 87591 N.GONORRHOEAE DNA AMP PROB: CPT

## 2021-04-01 PROCEDURE — 87491 CHLMYD TRACH DNA AMP PROBE: CPT

## 2021-04-01 PROCEDURE — 86765 RUBEOLA ANTIBODY: CPT

## 2021-04-01 PROCEDURE — 86480 TB TEST CELL IMMUN MEASURE: CPT | Performed by: FAMILY MEDICINE

## 2021-04-01 PROCEDURE — 80053 COMPREHEN METABOLIC PANEL: CPT

## 2021-04-02 LAB
C TRACH DNA SPEC QL NAA+PROBE: NEGATIVE
N GONORRHOEA DNA SPEC QL NAA+PROBE: NEGATIVE
RPR SER QL: REACTIVE
RPR SER-TITR: ABNORMAL {TITER}

## 2021-04-03 LAB — T PALLIDUM AB SER QL IF: REACTIVE

## 2021-04-05 LAB
GAMMA INTERFERON BACKGROUND BLD IA-ACNC: 0.18 IU/ML
M TB IFN-G BLD-IMP: NEGATIVE
M TB IFN-G CD4+ BCKGRND COR BLD-ACNC: 0.1 IU/ML
M TB IFN-G CD4+ BCKGRND COR BLD-ACNC: 0.13 IU/ML
MITOGEN IGNF BCKGRD COR BLD-ACNC: >10 IU/ML

## 2021-04-06 LAB
MEV IGG SER QL: NORMAL
MUV IGG SER QL: NORMAL

## 2021-04-12 ENCOUNTER — TELEMEDICINE (OUTPATIENT)
Dept: FAMILY MEDICINE CLINIC | Facility: CLINIC | Age: 36
End: 2021-04-12
Payer: COMMERCIAL

## 2021-04-12 DIAGNOSIS — U07.1 COVID-19: Primary | ICD-10-CM

## 2021-04-12 PROCEDURE — 99212 OFFICE O/P EST SF 10 MIN: CPT | Performed by: FAMILY MEDICINE

## 2021-04-12 NOTE — LETTER
April 12, 2021     Patient: Justyna Camp   YOB: 1985   Date of Visit: 4/12/2021       To Whom it May Concern:    Justyna Camp is under my professional care  He was seen in my office on 4/12/2021  He completed his quarantine period for COVID-19  He may return to work on 4/13/2021  If you have any questions or concerns, please don't hesitate to call           Sincerely,          Marisa Rincon MD        CC: No Recipients

## 2021-04-12 NOTE — PROGRESS NOTES
COVID-19 Outpatient Progress Note    Assessment/Plan:    Problem List Items Addressed This Visit     None      Visit Diagnoses     COVID-19    -  Primary         Disposition:     I recommended continued isolation until at least 24 hours have passed since recovery defined as resolution of fever without the use of fever-reducing medications AND improvement in COVID symptoms AND 10 days have passed since onset of symptoms (or 10 days have passed since date of first positive viral diagnostic test for asymptomatic patients)  I have spent 15 minutes directly with the patient  He has completed his quarantine periods  Needs note to return to work which was provided today  Encounter provider Emigdio Fields MD    Provider located at [address] [id number]    Recent Visits  No visits were found meeting these conditions  Showing recent visits within past 7 days and meeting all other requirements     Today's Visits  Date Type Provider Dept   04/12/21 Telemedicine Emigdio Fields, [address] [place] [address] today's visits and meeting all other requirements     Future Appointments  No visits were found meeting these conditions  Showing future appointments within next 150 days and meeting all other requirements        Patient agrees to participate in a virtual check in via telephone or video visit instead of presenting to the office to address urgent/immediate medical needs  Patient is aware this is a billable service  After connecting through Telephone, the patient was identified by name and date of birth  Elvira Best was informed that this was a telemedicine visit and that the exam was being conducted confidentially over secure lines  My office door was closed  No one else was in the room  Elvira Best acknowledged consent and understanding of privacy and security of the telemedicine visit   I informed the patient that I have reviewed his record in [address] [place] [address] and presented the opportunity for him to ask any questions regarding the visit today  The patient agreed to participate  It was my intent to perform this visit via video technology but the patient was not able to do a video connection so the visit was completed via audio telephone only  Subjective:   Shabbir Tong is a 30 y o  male who has been screened for COVID-19  Symptom change since last report: resolving  Patient denies fever, chills, fatigue, malaise, congestion, rhinorrhea, sore throat, anosmia, loss of taste, cough, shortness of breath, chest tightness, abdominal pain, nausea, vomiting, diarrhea, myalgias and headaches  Amanda Mcgovern has been staying home and has isolated themselves in his home  He is taking care to not share personal items and is cleaning all surfaces that are touched often, like counters, tabletops, and doorknobs using household cleaning sprays or wipes  He is wearing a mask when he leaves his room  Date of symptom onset: 3/28/2021  Date of positive COVID-19 PCR: 4/7/2021    No results found for: Brando Almonte  No past medical history on file  No past surgical history on file  Current Outpatient Medications   Medication Sig Dispense Refill    butalbital-acetaminophen-caffeine (FIORICET,ESGIC) -40 mg per tablet Take 1 tablet by mouth every 6 (six) hours as needed for migraine 40 tablet 1    DESCOVY 200-25 MG tablet       Multiple Vitamin (MULTIVITAMINS PO) Take by mouth       No current facility-administered medications for this visit  No Known Allergies    Review of Systems   Constitutional: Negative for chills, fatigue and fever  HENT: Negative for congestion, rhinorrhea and sore throat  Respiratory: Negative for cough, chest tightness and shortness of breath  Gastrointestinal: Negative for abdominal pain, diarrhea, nausea and vomiting  Musculoskeletal: Negative for myalgias     Neurological: Negative for headaches  Objective: There were no vitals filed for this visit  Physical Exam   It was my intent to perform this visit via video technology but the patient was not able to do a video connection so the visit was completed via audio telephone only  VIRTUAL VISIT DISCLAIMER    Viola Peace acknowledges that he has consented to an online visit or consultation  He understands that the online visit is based solely on information provided by him, and that, in the absence of a face-to-face physical evaluation by the physician, the diagnosis he receives is both limited and provisional in terms of accuracy and completeness  This is not intended to replace a full medical face-to-face evaluation by the physician  Viola Peace understands and accepts these terms

## 2021-04-27 ENCOUNTER — TELEPHONE (OUTPATIENT)
Dept: FAMILY MEDICINE CLINIC | Facility: CLINIC | Age: 36
End: 2021-04-27

## 2021-04-27 NOTE — TELEPHONE ENCOUNTER
Patient called and stated he needs a note for work stating that he is ok to go back to work and not contagious after having COVID      He had a telemed visit on 4/12/21 with Dr Makayla Bernal    Please call patient when note is ready for

## 2021-04-27 NOTE — LETTER
April 27, 2021     Patient: Nasir Bains   YOB: 1985   Date of Visit: 4/12/2021       To Whom it May Concern:    Nasir Bains is under my professional care  He was seen in my office on 4/12/2021  He tested positive for COVID-19 on 4/7/2021  He may return to work on 4/27/2021  If you have any questions or concerns, please don't hesitate to call           Sincerely,        Gasper Bernheim, MD

## 2021-11-22 ENCOUNTER — OFFICE VISIT (OUTPATIENT)
Dept: FAMILY MEDICINE CLINIC | Facility: CLINIC | Age: 36
End: 2021-11-22
Payer: COMMERCIAL

## 2021-11-22 VITALS
HEART RATE: 77 BPM | OXYGEN SATURATION: 97 % | SYSTOLIC BLOOD PRESSURE: 122 MMHG | BODY MASS INDEX: 31.22 KG/M2 | TEMPERATURE: 97.8 F | RESPIRATION RATE: 16 BRPM | HEIGHT: 68 IN | WEIGHT: 206 LBS | DIASTOLIC BLOOD PRESSURE: 80 MMHG

## 2021-11-22 DIAGNOSIS — Z00.00 HEALTHCARE MAINTENANCE: Primary | ICD-10-CM

## 2021-11-22 DIAGNOSIS — R79.9 ABNORMAL BLOOD CHEMISTRY: ICD-10-CM

## 2021-11-22 DIAGNOSIS — F41.9 ANXIETY: ICD-10-CM

## 2021-11-22 PROCEDURE — 3725F SCREEN DEPRESSION PERFORMED: CPT | Performed by: FAMILY MEDICINE

## 2021-11-22 PROCEDURE — 1036F TOBACCO NON-USER: CPT | Performed by: FAMILY MEDICINE

## 2021-11-22 PROCEDURE — 3008F BODY MASS INDEX DOCD: CPT | Performed by: FAMILY MEDICINE

## 2021-11-22 PROCEDURE — 99395 PREV VISIT EST AGE 18-39: CPT | Performed by: FAMILY MEDICINE

## 2021-12-24 LAB
BUN SERPL-MCNC: 14 MG/DL (ref 7–25)
BUN/CREAT SERPL: NORMAL (CALC) (ref 6–22)
CALCIUM SERPL-MCNC: 9.4 MG/DL (ref 8.6–10.3)
CHLORIDE SERPL-SCNC: 101 MMOL/L (ref 98–110)
CO2 SERPL-SCNC: 31 MMOL/L (ref 20–32)
CREAT SERPL-MCNC: 1.16 MG/DL (ref 0.6–1.35)
GLUCOSE SERPL-MCNC: 91 MG/DL (ref 65–139)
POTASSIUM SERPL-SCNC: 3.9 MMOL/L (ref 3.5–5.3)
SL AMB EGFR AFRICAN AMERICAN: 94 ML/MIN/1.73M2
SL AMB EGFR NON AFRICAN AMERICAN: 81 ML/MIN/1.73M2
SODIUM SERPL-SCNC: 137 MMOL/L (ref 135–146)

## 2022-11-13 PROBLEM — Z98.890 HISTORY OF BREAST SURGERY: Status: ACTIVE | Noted: 2022-11-13

## 2022-11-17 ENCOUNTER — OFFICE VISIT (OUTPATIENT)
Dept: FAMILY MEDICINE CLINIC | Facility: CLINIC | Age: 37
End: 2022-11-17

## 2022-11-17 VITALS
BODY MASS INDEX: 34.1 KG/M2 | WEIGHT: 225 LBS | TEMPERATURE: 97.8 F | DIASTOLIC BLOOD PRESSURE: 70 MMHG | SYSTOLIC BLOOD PRESSURE: 108 MMHG | OXYGEN SATURATION: 98 % | RESPIRATION RATE: 16 BRPM | HEIGHT: 68 IN | HEART RATE: 68 BPM

## 2022-11-17 DIAGNOSIS — E66.9 OBESITY (BMI 30-39.9): ICD-10-CM

## 2022-11-17 DIAGNOSIS — Z98.890 HISTORY OF BREAST SURGERY: ICD-10-CM

## 2022-11-17 DIAGNOSIS — N64.4 BREAST PAIN, LEFT: Primary | ICD-10-CM

## 2022-11-17 NOTE — PROGRESS NOTES
Assessment/Plan:    No problem-specific Assessment & Plan notes found for this encounter  Diagnoses and all orders for this visit:    Breast pain, left  -     US breast left limited (diagnostic); Future  -     Ambulatory Referral to Plastic Surgery; Future    History of breast surgery  -     Ambulatory Referral to Plastic Surgery; Future    Obesity (BMI 30-39  9)        Advised BMI likely skewed by muscle mass  Discussed cardio and lean muscle mass    Left breast tenderness at surgical site w/o superficial changes that would suggest infection  Scar tissue vs?  Check US   If normal or symptoms ongoing, offer surgical eval with his last plastic surgeon vs [facility]     Return if symptoms worsen or fail to improve  Subjective:      Patient ID: Viola Peace is a 40 y o  male  Chief Complaint   Patient presents with   • Pain     Chest area post surgery last year  Olman Kim [place]         HPI  Has gained wt  No diet restrictions per pt  Been to gym  Has left breast pain  Had b/l gynecomastia surge 2021    Pain for about 1y, getting worse    Notes pain with contraction and exercise  Sharp px  No dc or ooze or bleed  No fever    The following portions of the patient's history were reviewed and updated as appropriate: allergies, current medications, past family history, past medical history, past social history, past surgical history and problem list     Review of Systems   Constitutional: Negative for fever  Respiratory: Negative for cough and shortness of breath  Current Outpatient Medications   Medication Sig Dispense Refill   • DESCOVY 200-25 MG tablet      • Multiple Vitamin (MULTIVITAMINS PO) Take by mouth       No current facility-administered medications for this visit  Objective:    /70   Pulse 68   Temp 97 8 °F (36 6 °C)   Resp 16   Ht 5' 8" (1 727 m)   Wt 102 kg (225 lb)   SpO2 98%   BMI 34 21 kg/m²        Physical Exam  Vitals and nursing note reviewed     Constitutional: General: He is not in acute distress  Appearance: He is well-developed  He is not ill-appearing  HENT:      Head: Normocephalic  Right Ear: Tympanic membrane normal       Left Ear: Tympanic membrane normal    Eyes:      General: No scleral icterus  Conjunctiva/sclera: Conjunctivae normal    Cardiovascular:      Rate and Rhythm: Normal rate and regular rhythm  Pulses: Intact distal pulses  Heart sounds: No murmur heard  Pulmonary:      Effort: Pulmonary effort is normal  No respiratory distress  Breath sounds: No wheezing or rales  Abdominal:      Palpations: Abdomen is soft  Musculoskeletal:         General: No deformity or edema  Cervical back: Neck supple  Skin:     General: Skin is warm and dry  Coloration: Skin is not pale  Findings: No lesion  Comments: Left para-areolar tenderness w/o mass or crepitus  No adenopathy or mass in left breast  No axillary LA   Neurological:      Mental Status: He is alert  Psychiatric:         Behavior: Behavior normal          Thought Content:  Thought content normal                 Sara Brothers DO

## 2023-02-22 ENCOUNTER — HOSPITAL ENCOUNTER (OUTPATIENT)
Dept: RADIOLOGY | Facility: HOSPITAL | Age: 38
Discharge: HOME | End: 2023-02-22
Attending: FAMILY MEDICINE

## 2023-02-22 DIAGNOSIS — N64.4 BREAST PAIN, LEFT: ICD-10-CM

## 2023-04-26 ENCOUNTER — CONSULT (OUTPATIENT)
Dept: PLASTIC SURGERY | Facility: CLINIC | Age: 38
End: 2023-04-26

## 2023-04-26 VITALS
WEIGHT: 210 LBS | HEART RATE: 78 BPM | HEIGHT: 68 IN | TEMPERATURE: 97 F | BODY MASS INDEX: 31.83 KG/M2 | SYSTOLIC BLOOD PRESSURE: 132 MMHG | DIASTOLIC BLOOD PRESSURE: 86 MMHG

## 2023-04-26 DIAGNOSIS — Z41.1 ENCOUNTER FOR COSMETIC SURGERY: Primary | ICD-10-CM

## 2023-04-26 DIAGNOSIS — N64.4 BREAST PAIN, LEFT: ICD-10-CM

## 2023-04-26 DIAGNOSIS — Z98.890 HISTORY OF BREAST SURGERY: ICD-10-CM

## 2023-04-26 NOTE — PROGRESS NOTES
Assessment/Plan: Please see HPI  We discussed correction of the contour deficiency of the left breast, this is likely best addressed by autologous fat grafting  I discussed the procedure in detail, how it is performed, where the incision/scars will be located (bilateral flank donor sites, existing left breast scar), as well as potential risks, complications and limitations including, but not limited to infection, bleeding, scarring, asymmetry, contour deformity, lump/bump/cyst formation, fat necrosis, potential need for multiple procedures, etc   I strongly encouraged him to return to his original surgeon, his preference is to not do so  He will work with our coordinator regarding fees for the procedure, and if Lupe Kirby is interested in proceeding we will schedule a second visit with me to review the procedure, obtain consent, etc          Diagnoses and all orders for this visit:    History of breast surgery  -     Ambulatory Referral to Plastic Surgery    Breast pain, left  -     Ambulatory Referral to Plastic Surgery          Subjective: Status post gynecomastia surgery     Patient ID: Wilfredo Carmona is a 40 y o  male  HPIKency is a 44-year-old male, referred by Dr Joselyn Hawkins to address the patient's concerns regarding contour deformity of the left breast   He notes that approximately 2 years ago, at the [address] [place] [address], he underwent bilateral gynecomastia surgery, the left breast shows a contour deficiency of the lower pole of the breast inferior to the nipple areolar complex  The following portions of the patient's history were reviewed and updated as appropriate: allergies, current medications, past family history, past medical history, past social history, past surgical history and problem list     Review of Systems   Constitutional: Negative for chills and fever  HENT: Negative for hearing loss  Eyes: Positive for visual disturbance  Negative for discharge          Reading glasses "  Respiratory: Negative for chest tightness and shortness of breath  Cardiovascular: Negative for chest pain and leg swelling  Gastrointestinal: Positive for nausea  Negative for blood in stool, constipation and diarrhea  Genitourinary: Negative for dysuria  Musculoskeletal: Negative for gait problem  Skin: Negative for rash  Allergic/Immunologic: Negative for immunocompromised state  Neurological: Negative for seizures and headaches  Hematological: Does not bruise/bleed easily  Psychiatric/Behavioral: Positive for dysphoric mood  The patient is not nervous/anxious  Objective:      /86   Pulse 78   Temp (!) 97 °F (36 1 °C)   Ht 5' 8\" (1 727 m)   Wt 95 3 kg (210 lb)   BMI 31 93 kg/m²          Physical Exam  Constitutional:       Appearance: Normal appearance  HENT:      Head: Normocephalic and atraumatic  Eyes:      Extraocular Movements: Extraocular movements intact  Pupils: Pupils are equal, round, and reactive to light  Cardiovascular:      Rate and Rhythm: Normal rate  Pulmonary:      Effort: Pulmonary effort is normal    Abdominal:      Palpations: Abdomen is soft  Musculoskeletal:         General: Normal range of motion  Cervical back: Normal range of motion  Skin:     General: Skin is warm  Comments: Status post bilateral gynecomastia surgery, well-healed left breast inframammary scar, with contour deficiency/depression horizontally oriented inferior to the nipple areolar complex, area of concern approximately 12 x 3 cm, see photos   Neurological:      Mental Status: He is alert and oriented to person, place, and time     Psychiatric:         Mood and Affect: Mood normal          "

## 2023-05-27 PROBLEM — Z79.899 ON PRE-EXPOSURE PROPHYLAXIS FOR HIV: Status: ACTIVE | Noted: 2023-05-27

## 2023-05-31 ENCOUNTER — OFFICE VISIT (OUTPATIENT)
Dept: FAMILY MEDICINE CLINIC | Facility: CLINIC | Age: 38
End: 2023-05-31

## 2023-05-31 VITALS
BODY MASS INDEX: 33.6 KG/M2 | SYSTOLIC BLOOD PRESSURE: 124 MMHG | WEIGHT: 221 LBS | TEMPERATURE: 99.3 F | HEART RATE: 78 BPM | DIASTOLIC BLOOD PRESSURE: 78 MMHG | RESPIRATION RATE: 18 BRPM

## 2023-05-31 DIAGNOSIS — B34.9 VIRAL ILLNESS: Primary | ICD-10-CM

## 2023-05-31 LAB
SARS-COV-2 AG UPPER RESP QL IA: NEGATIVE
VALID CONTROL: NORMAL

## 2023-05-31 NOTE — PROGRESS NOTES
Assessment/Plan:  Rapid covid-19 test is negative in office and patient does not want lab send out at this time  Supportive care for viral illness discussed and advised  will follow up for any worsening and no improvement    1  Viral illness  -     POCT Rapid Covid Ag            Patient Instructions:  Increase fluid intake, saline nasal rinses, and hot tea with honey and lemon  Cool air humidification can be helpful as well  May take Tylenol as needed for pain or fevers  Mucinex D for sinus congestion or Coricidin HBP if you have high blood pressure or a heart condition  Mucinex or Robitussin DM are effective for cough and chest congestion  Return if symptoms worsen or fail to improve  No future appointments  Subjective:      Patient ID: Joss Landon is a 40 y o  male  Chief Complaint   Patient presents with   • Cold Like Symptoms     SX Saturday                 sas/cma         Vitals:  /78   Pulse 78   Temp 99 3 °F (37 4 °C)   Resp 18   Wt 100 kg (221 lb)   BMI 33 60 kg/m²     HPI  Patient stated that started with sore throat and cough on 5/27/2023  Denies fever, chills and sob  Somebody has covid-19 at his work and his employer would like him to be tested too  PHQ-2/9 Depression Screening    Little interest or pleasure in doing things: 0 - not at all  Feeling down, depressed, or hopeless: 0 - not at all  PHQ-2 Score: 0  PHQ-2 Interpretation: Negative depression screen             The following portions of the patient's history were reviewed and updated as appropriate: allergies, current medications, past family history, past medical history, past social history, past surgical history and problem list       Review of Systems   Constitutional: Negative for chills, diaphoresis, fatigue, fever and unexpected weight change  HENT: Positive for sore throat   Negative for congestion, dental problem, drooling, ear discharge, ear pain, facial swelling, hearing loss, mouth sores, nosebleeds, postnasal drip, rhinorrhea, sinus pressure, sinus pain, sneezing, tinnitus, trouble swallowing and voice change  Respiratory: Positive for cough  Negative for chest tightness, shortness of breath and wheezing  Cardiovascular: Negative  Gastrointestinal: Negative for abdominal pain, constipation, diarrhea, nausea and vomiting  Musculoskeletal: Negative  Skin: Negative  Neurological: Negative for dizziness, light-headedness and headaches  Objective:    Social History     Tobacco Use   Smoking Status Never   Smokeless Tobacco Never       Allergies: No Known Allergies      Current Outpatient Medications   Medication Sig Dispense Refill   • DESCOVY 200-25 MG tablet      • Multiple Vitamin (MULTIVITAMINS PO) Take by mouth       No current facility-administered medications for this visit  Physical Exam  Vitals reviewed  Constitutional:       Appearance: Normal appearance  He is well-developed  HENT:      Head: Normocephalic  Right Ear: Tympanic membrane, ear canal and external ear normal       Left Ear: Tympanic membrane, ear canal and external ear normal       Nose: Nose normal       Right Sinus: No maxillary sinus tenderness or frontal sinus tenderness  Left Sinus: No maxillary sinus tenderness or frontal sinus tenderness  Mouth/Throat:      Mouth: No oral lesions  Pharynx: No oropharyngeal exudate or posterior oropharyngeal erythema  Cardiovascular:      Rate and Rhythm: Normal rate and regular rhythm  Heart sounds: Normal heart sounds  Pulmonary:      Effort: Pulmonary effort is normal       Breath sounds: Normal breath sounds  Musculoskeletal:         General: Normal range of motion  Cervical back: Neck supple  Lymphadenopathy:      Cervical:      Right cervical: No superficial or posterior cervical adenopathy  Left cervical: No superficial or posterior cervical adenopathy     Skin:     General: Skin is warm and dry    Neurological:      Mental Status: He is alert and oriented to person, place, and time  Psychiatric:         Behavior: Behavior normal          Thought Content:  Thought content normal          Judgment: Judgment normal                      RAUL Denny

## 2023-05-31 NOTE — PATIENT INSTRUCTIONS
Viral Syndrome   AMBULATORY CARE:   Viral syndrome  is a term used for symptoms of an infection caused by a virus  Viruses are spread easily from person to person on shared items  Signs and symptoms  may start slowly or suddenly and last hours to days  They can be mild to severe and can change over days or hours  You may have any of the following:  Fever and chills    A runny or stuffy nose    Cough, sore throat, or hoarseness    Headache, or pain and pressure around your eyes    Muscle aches and joint pain    Shortness of breath or wheezing    Abdominal pain, cramps, and diarrhea    Nausea, vomiting, or loss of appetite    Call your local emergency number (911 in the [address] [place] [address],[address]), or have someone call if:   You have a seizure  You cannot be woken  You have chest pain or trouble breathing  Seek care immediately if:   You have a stiff neck, a bad headache, and sensitivity to light  You feel weak, dizzy, or confused  You stop urinating or urinate a lot less than usual     You cough up blood or thick yellow or green mucus  You have severe abdominal pain or your abdomen is larger than usual     Call your doctor if:   Your symptoms do not get better with treatment or get worse after 3 days  You have a rash or ear pain  You have burning when you urinate  You have questions or concerns about your condition or care  Treatment for viral syndrome  may include medicines to manage your symptoms  Antibiotics are not given for a viral infection  You may  need any of the following:  Acetaminophen  decreases pain and fever  It is available without a doctor's order  Ask how much to take and how often to take it  Follow directions  Read the labels of all other medicines you are using to see if they also contain acetaminophen, or ask your doctor or pharmacist  Acetaminophen can cause liver damage if not taken correctly  NSAIDs , such as ibuprofen, help decrease swelling, pain, and fever   NSAIDs can cause stomach bleeding or kidney problems in certain people  If you take blood thinner medicine, always ask your healthcare provider if NSAIDs are safe for you  Always read the medicine label and follow directions  Cold medicine  helps decrease swelling, control a cough, and relieve chest or nasal congestion  Saline nasal spray  helps decrease nasal congestion  Manage your symptoms:   Drink liquids as directed to prevent dehydration  Ask how much liquid to drink each day and which liquids are best for you  Do not drink liquids with caffeine  Caffeine can make dehydration worse  Get plenty of rest to help your body heal   Take naps throughout the day  Ask your healthcare provider when you can return to work and your normal activities  Use a cool mist humidifier  to increase air moisture in your home  This may make it easier for you to breathe and help decrease your cough  Drink tea with honey or use cough drops for a sore throat  Cough drops are available without a doctor's order  Follow directions for taking cough drops  Do not smoke or be close to anyone who is smoking  Nicotine and other chemicals in cigarettes and cigars can cause lung damage  Smoking can also delay healing  Ask your healthcare provider for information if you currently smoke and need help to quit  E-cigarettes or smokeless tobacco still contain nicotine  Talk to your healthcare provider before you use these products  Prevent the spread of germs:   Wash your hands often throughout the day  Use soap and water  Rub your soapy hands together, lacing your fingers, for at least 20 seconds  Rinse with warm, running water  Dry your hands with a clean towel or paper towel  Use hand  that contains alcohol if soap and water are not available  Teach children how to wash their hands and use hand   Cover sneezes and coughs  Turn your face away and cover your mouth and nose with a tissue  Throw the tissue away  Use the bend of your arm if a tissue is not available  Then wash your hands well with soap and water or use hand   Teach children how to cover a cough or sneeze  Stay home while you are sick  Avoid crowds as much as possible  Get the influenza (flu) vaccine as soon as recommended each year  The flu vaccine is available starting in September or October  Ask your healthcare provider about the pneumonia vaccine  This vaccine is usually recommended every 5 years in older adults  Follow up with your doctor as directed:  Write down your questions so you remember to ask them during your visits  © Copyright [employer] 2022 Information is for End User's use only and may not be sold, redistributed or otherwise used for commercial purposes  The above information is an  only  It is not intended as medical advice for individual conditions or treatments  Talk to your doctor, nurse or pharmacist before following any medical regimen to see if it is safe and effective for you

## 2023-07-12 ENCOUNTER — OFFICE VISIT (OUTPATIENT)
Dept: FAMILY MEDICINE CLINIC | Facility: CLINIC | Age: 38
End: 2023-07-12
Payer: COMMERCIAL

## 2023-07-12 VITALS
TEMPERATURE: 98.1 F | SYSTOLIC BLOOD PRESSURE: 128 MMHG | WEIGHT: 227.4 LBS | HEIGHT: 68 IN | BODY MASS INDEX: 34.46 KG/M2 | HEART RATE: 75 BPM | RESPIRATION RATE: 18 BRPM | DIASTOLIC BLOOD PRESSURE: 82 MMHG

## 2023-07-12 DIAGNOSIS — E66.9 OBESITY (BMI 30-39.9): ICD-10-CM

## 2023-07-12 DIAGNOSIS — Z13.83 SCREENING FOR CARDIOVASCULAR, RESPIRATORY, AND GENITOURINARY DISEASES: ICD-10-CM

## 2023-07-12 DIAGNOSIS — Z13.89 SCREENING FOR CARDIOVASCULAR, RESPIRATORY, AND GENITOURINARY DISEASES: ICD-10-CM

## 2023-07-12 DIAGNOSIS — R10.9 FLANK PAIN: Primary | ICD-10-CM

## 2023-07-12 DIAGNOSIS — Z13.6 SCREENING FOR CARDIOVASCULAR, RESPIRATORY, AND GENITOURINARY DISEASES: ICD-10-CM

## 2023-07-12 DIAGNOSIS — Z13.1 SCREENING FOR DIABETES MELLITUS (DM): ICD-10-CM

## 2023-07-12 DIAGNOSIS — R53.83 OTHER FATIGUE: ICD-10-CM

## 2023-07-12 PROBLEM — R79.9 ABNORMAL BLOOD CHEMISTRY: Status: RESOLVED | Noted: 2021-11-22 | Resolved: 2023-07-12

## 2023-07-12 PROCEDURE — 99214 OFFICE O/P EST MOD 30 MIN: CPT | Performed by: FAMILY MEDICINE

## 2023-07-12 NOTE — PROGRESS NOTES
Assessment/Plan:    No problem-specific Assessment & Plan notes found for this encounter. Flank pain  Likely msk but pt concerned about kidneys  Check labs and US  Offer PT if no better    Fatigue and some libido loss  Check cbc and testosterone  Aware will offer referral for TRT if indicated    bmi noted  Low fat diet and exercise suggested     Diagnoses and all orders for this visit:    Flank pain  -     US kidney and bladder; Future    Obesity (BMI 30-39. 9)    Screening for cardiovascular, respiratory, and genitourinary diseases  -     Lipid Panel with Direct LDL reflex; Future    Screening for diabetes mellitus (DM)  -     Comprehensive metabolic panel; Future    Other fatigue  -     CBC and differential; Future  -     Testosterone; Future        Return if symptoms worsen or fail to improve. Subjective:      Patient ID: Humberto Santos is a 40 y.o. male. Chief Complaint   Patient presents with   • Back Pain     Lower back pain, on and off/ Started over 1 month ago nm. lpn   • blood work request     Would like blood work   • Weight Loss     Having trouble losing weight       HPI  Has lbp  Taking working out supplements for wt loss and muscle building  Trouble with focus  Wants testosterone level     Been working out  Worried about kidneys  Back pain not triggered with rom    Drinks water but not much per pt  Does not like it much    The following portions of the patient's history were reviewed and updated as appropriate: allergies, current medications, past family history, past medical history, past social history, past surgical history and problem list.    Review of Systems   Constitutional: Positive for fatigue. Negative for chills and fever. Current Outpatient Medications   Medication Sig Dispense Refill   • DESCOVY 200-25 MG tablet      • Multiple Vitamin (MULTIVITAMINS PO) Take by mouth       No current facility-administered medications for this visit.        Objective:    /82   Pulse 75   Temp 98.1 °F (36.7 °C)   Resp 18   Ht 5' 8" (1.727 m)   Wt 103 kg (227 lb 6.4 oz)   BMI 34.58 kg/m²        Physical Exam  Vitals and nursing note reviewed. Constitutional:       Appearance: He is well-developed. He is obese. He is not ill-appearing. HENT:      Head: Normocephalic. Right Ear: Tympanic membrane normal.      Left Ear: Tympanic membrane normal.   Eyes:      General: No scleral icterus. Conjunctiva/sclera: Conjunctivae normal.   Neck:      Thyroid: No thyromegaly. Vascular: No carotid bruit. Cardiovascular:      Rate and Rhythm: Normal rate and regular rhythm. Pulmonary:      Effort: Pulmonary effort is normal. No respiratory distress. Breath sounds: No wheezing. Abdominal:      Palpations: Abdomen is soft. Tenderness: There is no right CVA tenderness or left CVA tenderness. Musculoskeletal:         General: No tenderness or deformity. Cervical back: Neck supple. No tenderness. Right lower leg: No edema. Left lower leg: No edema. Skin:     General: Skin is warm and dry. Coloration: Skin is not pale. Neurological:      Mental Status: He is alert. Motor: No weakness. Gait: Gait normal.   Psychiatric:         Mood and Affect: Mood normal.         Behavior: Behavior normal.         Thought Content: Thought content normal.         BMI Counseling: Body mass index is 34.58 kg/m². The BMI is above normal. Nutrition recommendations include decreasing portion sizes and moderation in carbohydrate intake. Exercise recommendations include exercising 3-5 times per week. No pharmacotherapy was ordered. Rationale for BMI follow-up plan is due to patient being overweight or obese.             Meliza Garcia DO

## 2023-07-19 ENCOUNTER — APPOINTMENT (OUTPATIENT)
Dept: LAB | Facility: HOSPITAL | Age: 38
End: 2023-07-19
Payer: COMMERCIAL

## 2023-07-19 ENCOUNTER — HOSPITAL ENCOUNTER (OUTPATIENT)
Dept: RADIOLOGY | Facility: HOSPITAL | Age: 38
Discharge: HOME | End: 2023-07-19
Attending: FAMILY MEDICINE
Payer: COMMERCIAL

## 2023-07-19 DIAGNOSIS — Z13.89 SCREENING FOR CARDIOVASCULAR, RESPIRATORY, AND GENITOURINARY DISEASES: ICD-10-CM

## 2023-07-19 DIAGNOSIS — R10.9 FLANK PAIN: ICD-10-CM

## 2023-07-19 DIAGNOSIS — Z13.1 SCREENING FOR DIABETES MELLITUS (DM): ICD-10-CM

## 2023-07-19 DIAGNOSIS — Z13.6 SCREENING FOR CARDIOVASCULAR, RESPIRATORY, AND GENITOURINARY DISEASES: ICD-10-CM

## 2023-07-19 DIAGNOSIS — Z13.83 SCREENING FOR CARDIOVASCULAR, RESPIRATORY, AND GENITOURINARY DISEASES: ICD-10-CM

## 2023-07-19 DIAGNOSIS — R53.83 OTHER FATIGUE: ICD-10-CM

## 2023-07-19 LAB
ALBUMIN SERPL BCP-MCNC: 4.4 G/DL (ref 3.5–5)
ALP SERPL-CCNC: 72 U/L (ref 34–104)
ALT SERPL W P-5'-P-CCNC: 26 U/L (ref 7–52)
ANION GAP SERPL CALCULATED.3IONS-SCNC: 4 MMOL/L
AST SERPL W P-5'-P-CCNC: 24 U/L (ref 13–39)
BASOPHILS # BLD AUTO: 0.03 THOUSANDS/ÂΜL (ref 0–0.1)
BASOPHILS NFR BLD AUTO: 1 % (ref 0–1)
BILIRUB SERPL-MCNC: 0.4 MG/DL (ref 0.2–1)
BUN SERPL-MCNC: 13 MG/DL (ref 5–25)
CALCIUM SERPL-MCNC: 9.5 MG/DL (ref 8.4–10.2)
CHLORIDE SERPL-SCNC: 101 MMOL/L (ref 96–108)
CHOLEST SERPL-MCNC: 175 MG/DL
CO2 SERPL-SCNC: 30 MMOL/L (ref 21–32)
CREAT SERPL-MCNC: 1.05 MG/DL (ref 0.6–1.3)
EOSINOPHIL # BLD AUTO: 0.16 THOUSAND/ÂΜL (ref 0–0.61)
EOSINOPHIL NFR BLD AUTO: 3 % (ref 0–6)
ERYTHROCYTE [DISTWIDTH] IN BLOOD BY AUTOMATED COUNT: 12.7 % (ref 11.6–15.1)
GFR SERPL CREATININE-BSD FRML MDRD: 90 ML/MIN/1.73SQ M
GLUCOSE P FAST SERPL-MCNC: 89 MG/DL (ref 65–99)
HCT VFR BLD AUTO: 45.9 % (ref 36.5–49.3)
HDLC SERPL-MCNC: 54 MG/DL
HGB BLD-MCNC: 15.8 G/DL (ref 12–17)
IMM GRANULOCYTES # BLD AUTO: 0.01 THOUSAND/UL (ref 0–0.2)
IMM GRANULOCYTES NFR BLD AUTO: 0 % (ref 0–2)
LDLC SERPL CALC-MCNC: 73 MG/DL (ref 0–100)
LYMPHOCYTES # BLD AUTO: 1.68 THOUSANDS/ÂΜL (ref 0.6–4.47)
LYMPHOCYTES NFR BLD AUTO: 32 % (ref 14–44)
MCH RBC QN AUTO: 29.5 PG (ref 26.8–34.3)
MCHC RBC AUTO-ENTMCNC: 34.4 G/DL (ref 31.4–37.4)
MCV RBC AUTO: 86 FL (ref 82–98)
MONOCYTES # BLD AUTO: 0.41 THOUSAND/ÂΜL (ref 0.17–1.22)
MONOCYTES NFR BLD AUTO: 8 % (ref 4–12)
NEUTROPHILS # BLD AUTO: 2.95 THOUSANDS/ÂΜL (ref 1.85–7.62)
NEUTS SEG NFR BLD AUTO: 56 % (ref 43–75)
NRBC BLD AUTO-RTO: 0 /100 WBCS
PLATELET # BLD AUTO: 187 THOUSANDS/UL (ref 149–390)
PMV BLD AUTO: 9.4 FL (ref 8.9–12.7)
POTASSIUM SERPL-SCNC: 3.9 MMOL/L (ref 3.5–5.3)
PROT SERPL-MCNC: 7.2 G/DL (ref 6.4–8.4)
RBC # BLD AUTO: 5.35 MILLION/UL (ref 3.88–5.62)
SODIUM SERPL-SCNC: 135 MMOL/L (ref 135–147)
TESTOST SERPL-MSCNC: 332 NG/DL
TRIGL SERPL-MCNC: 242 MG/DL
WBC # BLD AUTO: 5.24 THOUSAND/UL (ref 4.31–10.16)

## 2023-07-19 PROCEDURE — 80061 LIPID PANEL: CPT

## 2023-07-19 PROCEDURE — 36415 COLL VENOUS BLD VENIPUNCTURE: CPT

## 2023-07-19 PROCEDURE — 84403 ASSAY OF TOTAL TESTOSTERONE: CPT

## 2023-07-19 PROCEDURE — 76775 US EXAM ABDO BACK WALL LIM: CPT

## 2023-07-19 PROCEDURE — 85025 COMPLETE CBC W/AUTO DIFF WBC: CPT

## 2023-07-19 PROCEDURE — 80053 COMPREHEN METABOLIC PANEL: CPT

## 2024-02-21 PROBLEM — Z00.00 HEALTHCARE MAINTENANCE: Status: RESOLVED | Noted: 2019-04-30 | Resolved: 2024-02-21

## 2024-02-21 PROBLEM — Z13.1 SCREENING FOR DIABETES MELLITUS (DM): Status: RESOLVED | Noted: 2019-04-30 | Resolved: 2024-02-21

## 2024-02-21 PROBLEM — Z13.89 SCREENING FOR CARDIOVASCULAR, RESPIRATORY, AND GENITOURINARY DISEASES: Status: RESOLVED | Noted: 2019-04-30 | Resolved: 2024-02-21

## 2024-02-21 PROBLEM — Z13.6 SCREENING FOR CARDIOVASCULAR, RESPIRATORY, AND GENITOURINARY DISEASES: Status: RESOLVED | Noted: 2019-04-30 | Resolved: 2024-02-21

## 2024-02-21 PROBLEM — Z13.83 SCREENING FOR CARDIOVASCULAR, RESPIRATORY, AND GENITOURINARY DISEASES: Status: RESOLVED | Noted: 2019-04-30 | Resolved: 2024-02-21

## 2024-05-21 ENCOUNTER — OFFICE VISIT (OUTPATIENT)
Dept: FAMILY MEDICINE CLINIC | Facility: CLINIC | Age: 39
End: 2024-05-21
Payer: COMMERCIAL

## 2024-05-21 VITALS
WEIGHT: 227 LBS | SYSTOLIC BLOOD PRESSURE: 120 MMHG | BODY MASS INDEX: 34.4 KG/M2 | RESPIRATION RATE: 16 BRPM | DIASTOLIC BLOOD PRESSURE: 70 MMHG | HEIGHT: 68 IN | OXYGEN SATURATION: 97 % | HEART RATE: 77 BPM | TEMPERATURE: 96.4 F

## 2024-05-21 DIAGNOSIS — E78.1 HIGH TRIGLYCERIDES: ICD-10-CM

## 2024-05-21 DIAGNOSIS — R53.83 OTHER FATIGUE: Primary | ICD-10-CM

## 2024-05-21 DIAGNOSIS — E66.9 OBESITY (BMI 30-39.9): ICD-10-CM

## 2024-05-21 PROCEDURE — 99214 OFFICE O/P EST MOD 30 MIN: CPT | Performed by: FAMILY MEDICINE

## 2024-05-21 NOTE — PROGRESS NOTES
"Assessment/Plan:    No problem-specific Assessment & Plan notes found for this encounter.    Fatigue with low libido  Check labs  Last testosterone level aware  Understands I do not rx testosterone but pending labs I can refer to urology    Bmi 34  BMI likely skewed by muscle mass.  Criteria for obesity advised  Pt wants to see a bariatric surgeon    High trig 242 in past  Low fat diet advised    Declined referral for ANDRADE testing     Diagnoses and all orders for this visit:    Other fatigue  -     TSH, 3rd generation; Future  -     CBC and differential; Future  -     Testosterone; Future    Obesity (BMI 30-39.9)  -     Ambulatory Referral to General Surgery; Future    BMI 34.0-34.9,adult  -     Ambulatory Referral to General Surgery; Future    High triglycerides        Return if symptoms worsen or fail to improve.    Subjective:      Patient ID: John Bauman is a 38 y.o. male.    Chief Complaint   Patient presents with    testosterone     Doreen Acosta MA        HPI  Goes to gym  Fatigue  Low motivation  Low libido  Testosterone level 332 on 7/19/23  Sleeps well  Snores  No PND    The following portions of the patient's history were reviewed and updated as appropriate: allergies, current medications, past family history, past medical history, past social history, past surgical history and problem list.    Review of Systems   Constitutional:  Positive for fatigue. Negative for chills and fever.         Current Outpatient Medications   Medication Sig Dispense Refill    DESCOVY 200-25 MG tablet       Multiple Vitamin (MULTIVITAMINS PO) Take by mouth       No current facility-administered medications for this visit.       Objective:    /70   Pulse 77   Temp (!) 96.4 °F (35.8 °C)   Resp 16   Ht 5' 8\" (1.727 m)   Wt 103 kg (227 lb)   SpO2 97%   BMI 34.52 kg/m²        Physical Exam  Vitals and nursing note reviewed.   Constitutional:       General: He is not in acute distress.     Appearance: He is " well-developed. He is not ill-appearing.   HENT:      Head: Normocephalic.      Right Ear: Tympanic membrane normal.      Left Ear: Tympanic membrane normal.   Eyes:      General: No scleral icterus.     Conjunctiva/sclera: Conjunctivae normal.   Neck:      Thyroid: No thyromegaly.   Cardiovascular:      Rate and Rhythm: Normal rate and regular rhythm.      Heart sounds: No murmur heard.  Pulmonary:      Effort: Pulmonary effort is normal. No respiratory distress.      Breath sounds: No wheezing.   Abdominal:      Palpations: Abdomen is soft.   Musculoskeletal:         General: No deformity.      Cervical back: Neck supple. No tenderness.      Right lower leg: No edema.      Left lower leg: No edema.   Skin:     General: Skin is warm and dry.      Coloration: Skin is not pale.   Neurological:      Mental Status: He is alert.      Motor: No weakness.      Gait: Gait normal.   Psychiatric:         Mood and Affect: Mood normal.         Behavior: Behavior normal.         Thought Content: Thought content normal.                Pan Houser DO

## 2024-06-08 LAB
BASOPHILS # BLD AUTO: 28 CELLS/UL (ref 0–200)
BASOPHILS NFR BLD AUTO: 0.6 %
EOSINOPHIL # BLD AUTO: 99 CELLS/UL (ref 15–500)
EOSINOPHIL NFR BLD AUTO: 2.1 %
ERYTHROCYTE [DISTWIDTH] IN BLOOD BY AUTOMATED COUNT: 13.8 % (ref 11–15)
HCT VFR BLD AUTO: 44.1 % (ref 38.5–50)
HGB BLD-MCNC: 14.9 G/DL (ref 13.2–17.1)
LYMPHOCYTES # BLD AUTO: 1669 CELLS/UL (ref 850–3900)
LYMPHOCYTES NFR BLD AUTO: 35.5 %
MCH RBC QN AUTO: 29.3 PG (ref 27–33)
MCHC RBC AUTO-ENTMCNC: 33.8 G/DL (ref 32–36)
MCV RBC AUTO: 86.8 FL (ref 80–100)
MONOCYTES # BLD AUTO: 338 CELLS/UL (ref 200–950)
MONOCYTES NFR BLD AUTO: 7.2 %
NEUTROPHILS # BLD AUTO: 2566 CELLS/UL (ref 1500–7800)
NEUTROPHILS NFR BLD AUTO: 54.6 %
PLATELET # BLD AUTO: 201 THOUSAND/UL (ref 140–400)
PMV BLD REES-ECKER: 10.3 FL (ref 7.5–12.5)
RBC # BLD AUTO: 5.08 MILLION/UL (ref 4.2–5.8)
TESTOST SERPL-MCNC: 311 NG/DL (ref 250–827)
TSH SERPL-ACNC: 1.94 MIU/L (ref 0.4–4.5)
WBC # BLD AUTO: 4.7 THOUSAND/UL (ref 3.8–10.8)

## 2024-07-09 ENCOUNTER — TELEPHONE (OUTPATIENT)
Age: 39
End: 2024-07-09

## 2024-07-09 NOTE — TELEPHONE ENCOUNTER
A. Relayed results to (patient/patient representative as listed on communication consent form) as per provider message. Patient/Patient Representative expressed understanding and did not have any further questions.                                                              B. If addending telephone encounter created by office, sign and close. If no existing encounter, Route to OFFICE CLINICAL POOL with request to: Please complete Result Management task.

## 2024-08-01 ENCOUNTER — TELEPHONE (OUTPATIENT)
Age: 39
End: 2024-08-01

## 2024-08-01 NOTE — TELEPHONE ENCOUNTER
John called in to see is Dr Houser is will willing to discuss weight loss medication Ozempic. He did set up an appointment already for 8/27. If this can be a virtual visit he would like to change it as taking a day off work to make this appointment would be a waste of time if Dr Houser is not willing to prescribe this for him. Please Advise Thank you.

## 2024-08-25 PROBLEM — R10.9 FLANK PAIN: Status: RESOLVED | Noted: 2023-07-12 | Resolved: 2024-08-25

## 2024-10-07 ENCOUNTER — APPOINTMENT (OUTPATIENT)
Dept: RADIOLOGY | Facility: CLINIC | Age: 39
End: 2024-10-07

## 2024-10-07 PROCEDURE — 71045 X-RAY EXAM CHEST 1 VIEW: CPT
